# Patient Record
Sex: FEMALE | Race: WHITE | NOT HISPANIC OR LATINO | Employment: OTHER | URBAN - METROPOLITAN AREA
[De-identification: names, ages, dates, MRNs, and addresses within clinical notes are randomized per-mention and may not be internally consistent; named-entity substitution may affect disease eponyms.]

---

## 2021-07-27 PROBLEM — I10 HYPERTENSION: Status: ACTIVE | Noted: 2021-07-27

## 2021-07-27 PROBLEM — J44.9 COPD (CHRONIC OBSTRUCTIVE PULMONARY DISEASE) (HCC): Status: ACTIVE | Noted: 2021-07-27

## 2021-07-27 PROBLEM — I63.312 CEREBROVASCULAR ACCIDENT (CVA) DUE TO THROMBOSIS OF LEFT MIDDLE CEREBRAL ARTERY (HCC): Status: ACTIVE | Noted: 2020-09-23

## 2021-07-27 PROBLEM — E11.3293 TYPE 2 DIABETES MELLITUS WITH BOTH EYES AFFECTED BY MILD NONPROLIFERATIVE RETINOPATHY WITHOUT MACULAR EDEMA, WITHOUT LONG-TERM CURRENT USE OF INSULIN (HCC): Status: ACTIVE | Noted: 2021-07-27

## 2021-07-27 PROBLEM — G45.9 TIA (TRANSIENT ISCHEMIC ATTACK): Status: ACTIVE | Noted: 2021-07-22

## 2021-07-27 PROBLEM — M19.011 OSTEOARTHRITIS OF RIGHT SHOULDER: Status: ACTIVE | Noted: 2017-12-06

## 2021-07-27 PROBLEM — K80.20 GALLSTONES: Status: ACTIVE | Noted: 2021-07-27

## 2021-07-27 PROBLEM — E78.5 HYPERLIPEMIA: Status: ACTIVE | Noted: 2020-12-28

## 2021-07-27 PROBLEM — K86.2 PANCREATIC CYST: Status: ACTIVE | Noted: 2021-07-27

## 2021-09-29 PROBLEM — R10.13 EPIGASTRIC PAIN: Status: ACTIVE | Noted: 2021-09-29

## 2022-10-06 ENCOUNTER — TELEPHONE (OUTPATIENT)
Dept: OTHER | Facility: OTHER | Age: 82
End: 2022-10-06

## 2022-10-06 NOTE — TELEPHONE ENCOUNTER
Patient called saying that Dr Taurus Sylvester told her to call Dr Ayala Milligan office to schedule an appointment with Dr yAala Milligan, patient is asking if the office can give her a call to schedule her appointment with Dr Ayala Milligan  Patient stated that she is available to take any day or time for the appointment and is is unavailable to leave message on her answering machine with date and time of appointment  Patient had Cat-Scan on 9/29

## 2022-10-28 ENCOUNTER — CONSULT (OUTPATIENT)
Dept: GASTROENTEROLOGY | Facility: MEDICAL CENTER | Age: 82
End: 2022-10-28
Attending: INTERNAL MEDICINE

## 2022-10-28 VITALS
BODY MASS INDEX: 28.91 KG/M2 | WEIGHT: 163.2 LBS | HEART RATE: 73 BPM | SYSTOLIC BLOOD PRESSURE: 163 MMHG | TEMPERATURE: 97.9 F | DIASTOLIC BLOOD PRESSURE: 75 MMHG

## 2022-10-28 DIAGNOSIS — K21.9 GASTROESOPHAGEAL REFLUX DISEASE, UNSPECIFIED WHETHER ESOPHAGITIS PRESENT: ICD-10-CM

## 2022-10-28 DIAGNOSIS — K86.2 PANCREATIC CYST: Primary | ICD-10-CM

## 2022-10-28 NOTE — PATIENT INSTRUCTIONS
Scheduled date of EUS (as of today) 12/6/22  Physician performing Dr Vlad Asher:  Location of procedure  [place]:  Bowel prep reviewed with patient: rosa  Instructions reviewed with patient by: ma  Clearances: rosa BACA

## 2022-10-28 NOTE — PROGRESS NOTES
Assessment/Plan:     Diagnoses and all orders for this visit:    Pancreatic cyst  Patient has a known pancreatic cyst which has enlarged over the last 2 years concerning for malignancy  Would recommend endoscopic ultrasound with FNA  Risks and benefits of the procedure were discussed and she is agreeable to proceed  -     Endoscopic ultrasonography, GI (Upper) Linear with FNA; Future    Gastroesophageal reflux disease, unspecified whether esophagitis present  She does have a history of heartburn  She uses Pepcid dinnertime with good control of her symptoms  Would recommend continuing  She also describes vague abdominal pain which she feels may be secondary to gas  Did recommend over-the-counter Gas-X  Will see her back after to discuss all results          Subjective:      Patient ID: Frances Ocasio is a [de-identified] y o  female  HPI     Patient was referred for a pancreatic cyst   She has past medical history of diabetes, TIA, breast cancer, hypertension, COPD and GERD  Patient was found to have a pancreatic cyst dating back to at least 2015 measuring approximately 1 cm  It has been followed with imaging  Most recent CT scan was performed in September which showed that this had enlarged over the last 2 years now measuring 2 4 x 4 4 cm  She does admit to some postprandial abdominal pain but more so on the left  She also describes pain radiating into her shoulders  She does take Pepcid at dinnertime with good control of her heartburn  She describes some gas pain but has not taken anything for that  She describes her bowels as regular  She denies weight loss      Patient Active Problem List   Diagnosis   • Type 2 diabetes mellitus with both eyes affected by mild nonproliferative retinopathy without macular edema, without long-term current use of insulin (HCC)   • TIA (transient ischemic attack)   • Personal history of breast cancer   • Osteoarthritis of right shoulder   • Hypertension   • Hyperlipemia   • COPD (chronic obstructive pulmonary disease) (HCC)   • Cerebrovascular accident (CVA) due to thrombosis of left middle cerebral artery (HCC)   • Pancreatic cyst   • Gallstones   • Epigastric pain   • GERD (gastroesophageal reflux disease)     Allergies   Allergen Reactions   • Biaxin [Clarithromycin] Throat Swelling   • Latex Blisters   • Sulfa Antibiotics Hives     Current Outpatient Medications on File Prior to Visit   Medication Sig   • amLODIPine (NORVASC) 5 mg tablet    • atorvastatin (LIPITOR) 40 mg tablet    • BIOTIN PO Take by mouth daily   • Cholecalciferol 50 MCG (2000 UT) CAPS Take 2 capsules by mouth daily   • clopidogrel (PLAVIX) 75 mg tablet    • famotidine (PEPCID) 40 MG tablet    • glimepiride (AMARYL) 1 mg tablet    • losartan (COZAAR) 50 mg tablet    • metFORMIN (GLUCOPHAGE-XR) 500 mg 24 hr tablet    • Multiple Vitamin (MULTIVITAMIN PO) Take 1 tablet by mouth daily   • Naproxen Sodium (ALEVE PO) Take by mouth once as needed   • OMEGA-3 FATTY ACIDS PO Take 2 g by mouth daily   • aspirin (ECOTRIN LOW STRENGTH) 81 mg EC tablet Take 81 mg by mouth daily   (Patient not taking: No sig reported)   • gabapentin (NEURONTIN) 100 mg capsule Take 100 mg by mouth 4 (four) times a day   • oxymetazoline (AFRIN) 0 05 % nasal spray 2 sprays by Each Nare route 2 (two) times a day for 4 days (Patient not taking: Reported on 9/12/2022)     No current facility-administered medications on file prior to visit       Family History   Problem Relation Age of Onset   • Colon cancer Mother    • Lung cancer Father    • Throat cancer Father    • Breast cancer Maternal Aunt      Past Medical History:   Diagnosis Date   • Migraine      Social History     Socioeconomic History   • Marital status:      Spouse name: None   • Number of children: None   • Years of education: None   • Highest education level: None   Occupational History   • Occupation: Retired - Bookeeping/HR   Tobacco Use   • Smoking status: Never Smoker   • Smokeless tobacco: Never Used   Substance and Sexual Activity   • Alcohol use: Not Currently     Comment: rarely   • Drug use: None   • Sexual activity: None   Other Topics Concern   • None   Social History Narrative   • None     Social Determinants of Health     Financial Resource Strain: Not on file   Food Insecurity: Not on file   Transportation Needs: Not on file   Physical Activity: Not on file   Stress: Not on file   Social Connections: Not on file   Intimate Partner Violence: Not on file   Housing Stability: Not on file     Past Surgical History:   Procedure Laterality Date   • EGD  2022    dr Damaris Kunz; 2cm h hernia         Review of Systems   All other systems reviewed and are negative  Objective:      /75   Pulse 73   Temp 97 9 °F (36 6 °C) (Tympanic)   Wt 74 kg (163 lb 3 2 oz)   BMI 28 91 kg/m²          Physical Exam  Constitutional:       Appearance: Normal appearance  She is well-developed  HENT:      Head: Normocephalic and atraumatic  Eyes:      Conjunctiva/sclera: Conjunctivae normal    Cardiovascular:      Rate and Rhythm: Normal rate and regular rhythm  Pulmonary:      Effort: Pulmonary effort is normal       Breath sounds: Normal breath sounds  Abdominal:      General: Bowel sounds are normal  There is no distension  Palpations: Abdomen is soft  Tenderness: There is no abdominal tenderness  Musculoskeletal:      Cervical back: Normal range of motion  Comments: Walks with cane   Skin:     General: Skin is warm and dry  Neurological:      Mental Status: She is alert and oriented to person, place, and time     Psychiatric:         Mood and Affect: Mood normal          Behavior: Behavior normal

## 2022-11-02 ENCOUNTER — TELEPHONE (OUTPATIENT)
Dept: GASTROENTEROLOGY | Facility: CLINIC | Age: 82
End: 2022-11-02

## 2022-11-02 NOTE — TELEPHONE ENCOUNTER
Patients GI provider:  Dr Tequila Douglas    Number to return call: [phone]    Reason for call: Pt calling to see if she can have her procedure any sooner and/or be put on a cancellation list  Please return her call      Scheduled procedure/appointment date if applicable: Apt/procedure 12/6/22

## 2022-11-07 NOTE — TELEPHONE ENCOUNTER
Patient called back stating Dr Shante Ramirez manages her Plavix and medication clearance has been faxed to [phone]  Will monitor for a reply

## 2022-11-07 NOTE — TELEPHONE ENCOUNTER
Patient called back leaving a message that 11/15/22 will work for her  Called patient back and left message if she is still taking Plavix or not  Asked that she call me back on my direct line to clarify

## 2022-11-08 NOTE — TELEPHONE ENCOUNTER
Left detailed message for patient advising she may hold her Plavix 5 days prior to upcoming procedure and to call back with any questions

## 2022-11-15 ENCOUNTER — ANESTHESIA (OUTPATIENT)
Dept: GASTROENTEROLOGY | Facility: HOSPITAL | Age: 82
End: 2022-11-15

## 2022-11-15 ENCOUNTER — HOSPITAL ENCOUNTER (OUTPATIENT)
Dept: GASTROENTEROLOGY | Facility: HOSPITAL | Age: 82
Setting detail: OUTPATIENT SURGERY
Discharge: HOME | End: 2022-11-15
Attending: INTERNAL MEDICINE

## 2022-11-15 ENCOUNTER — ANESTHESIA EVENT (OUTPATIENT)
Dept: GASTROENTEROLOGY | Facility: HOSPITAL | Age: 82
End: 2022-11-15

## 2022-11-15 VITALS
DIASTOLIC BLOOD PRESSURE: 73 MMHG | RESPIRATION RATE: 16 BRPM | HEART RATE: 63 BPM | OXYGEN SATURATION: 96 % | TEMPERATURE: 97.2 F | SYSTOLIC BLOOD PRESSURE: 137 MMHG

## 2022-11-15 DIAGNOSIS — K86.2 PANCREATIC CYST: ICD-10-CM

## 2022-11-15 RX ORDER — SODIUM CHLORIDE, SODIUM LACTATE, POTASSIUM CHLORIDE, CALCIUM CHLORIDE 600; 310; 30; 20 MG/100ML; MG/100ML; MG/100ML; MG/100ML
50 INJECTION, SOLUTION INTRAVENOUS CONTINUOUS
Status: DISCONTINUED | OUTPATIENT
Start: 2022-11-15 | End: 2022-11-19 | Stop reason: HOSPADM

## 2022-11-15 RX ORDER — CIPROFLOXACIN 2 MG/ML
INJECTION, SOLUTION INTRAVENOUS CONTINUOUS PRN
Status: DISCONTINUED | OUTPATIENT
Start: 2022-11-15 | End: 2022-11-15

## 2022-11-15 RX ORDER — LIDOCAINE HYDROCHLORIDE 10 MG/ML
0.5 INJECTION, SOLUTION EPIDURAL; INFILTRATION; INTRACAUDAL; PERINEURAL ONCE AS NEEDED
Status: DISCONTINUED | OUTPATIENT
Start: 2022-11-15 | End: 2022-11-19 | Stop reason: HOSPADM

## 2022-11-15 RX ORDER — LEVOFLOXACIN 500 MG/1
500 TABLET, FILM COATED ORAL EVERY 24 HOURS
Qty: 3 TABLET | Refills: 0 | Status: SHIPPED | OUTPATIENT
Start: 2022-11-15 | End: 2022-11-18

## 2022-11-15 RX ORDER — PROPOFOL 10 MG/ML
INJECTION, EMULSION INTRAVENOUS AS NEEDED
Status: DISCONTINUED | OUTPATIENT
Start: 2022-11-15 | End: 2022-11-15

## 2022-11-15 RX ORDER — SODIUM CHLORIDE 9 MG/ML
INJECTION, SOLUTION INTRAVENOUS CONTINUOUS PRN
Status: DISCONTINUED | OUTPATIENT
Start: 2022-11-15 | End: 2022-11-15

## 2022-11-15 RX ADMIN — PROPOFOL 30 MG: 10 INJECTION, EMULSION INTRAVENOUS at 11:20

## 2022-11-15 RX ADMIN — CIPROFLOXACIN: 2 INJECTION, SOLUTION INTRAVENOUS at 11:12

## 2022-11-15 RX ADMIN — PROPOFOL 20 MG: 10 INJECTION, EMULSION INTRAVENOUS at 11:16

## 2022-11-15 RX ADMIN — SODIUM CHLORIDE: 0.9 INJECTION, SOLUTION INTRAVENOUS at 11:09

## 2022-11-15 RX ADMIN — PROPOFOL 40 MG: 10 INJECTION, EMULSION INTRAVENOUS at 11:25

## 2022-11-15 RX ADMIN — PROPOFOL 50 MG: 10 INJECTION, EMULSION INTRAVENOUS at 11:15

## 2022-11-15 RX ADMIN — PROPOFOL 30 MG: 10 INJECTION, EMULSION INTRAVENOUS at 11:18

## 2022-11-15 RX ADMIN — PROPOFOL 50 MG: 10 INJECTION, EMULSION INTRAVENOUS at 11:31

## 2022-11-15 NOTE — H&P
[facility] and Physical -  [facility]  Jona Sales [de-identified] y o  female MRN: [id number]                  HPI: Jona Sales is a [de-identified]y o  year old female who presents for EUS with FNA of pancreatic cyst      REVIEW OF SYSTEMS: Per the HPI, and otherwise unremarkable  Historical Information   Past Medical History:   Diagnosis Date   • Arthritis    • COPD (chronic obstructive pulmonary disease) ([facility] )    • Diabetes mellitus ([facility] )    • Hyperlipidemia    • Hypertension    • Migraine    • Stroke [facility])      Past Surgical History:   Procedure Laterality Date   • BREAST SURGERY     • EGD  2022    dr Justo Locke; 2cm h hernia   • JOINT REPLACEMENT     • OOPHORECTOMY Left     1990   • SKIN BIOPSY       Social History   Social History     Substance and Sexual Activity   Alcohol Use Not Currently    Comment: rarely     Social History     Substance and Sexual Activity   Drug Use Not on file     Social History     Tobacco Use   Smoking Status Never Smoker   Smokeless Tobacco Never Used     Family History   Problem Relation Age of Onset   • Colon cancer Mother    • Lung cancer Father    • Throat cancer Father    • Breast cancer Maternal Aunt        Meds/Allergies     (Not in a hospital admission)      Allergies   Allergen Reactions   • Biaxin [Clarithromycin] Throat Swelling   • Latex Blisters   • Sulfa Antibiotics Hives       Objective     There were no vitals taken for this visit        PHYSICAL EXAM    Gen: NAD  CV: RRR  CHEST: Clear  ABD: soft, NT/ND  EXT: no edema      ASSESSMENT/PLAN:  This is a [de-identified]y o  year old female here for EUS with FNA

## 2022-11-15 NOTE — ANESTHESIA POSTPROCEDURE EVALUATION
Post-Op Assessment Note    CV Status:  Stable  Pain Score: 0    Pain management: adequate     Mental Status:  Alert and awake   Hydration Status:  Euvolemic   PONV Controlled:  Controlled   Airway Patency:  Patent      Post Op Vitals Reviewed: Yes      Staff: CRNA         No complications documented      /65 (11/15/22 1142)    Temp (!) 97 2 °F (36 2 °C) (11/15/22 1142)    Pulse 69 (11/15/22 1142)   Resp 16 (11/15/22 1142)    SpO2 95 % (11/15/22 1142)

## 2022-11-15 NOTE — ANESTHESIA PREPROCEDURE EVALUATION
Procedure:  ENDOSCOPIC ULTRASOUND (UPPER)    Relevant Problems   CARDIO   (+) Hyperlipemia   (+) Hypertension      ENDO   (+) Type 2 diabetes mellitus with both eyes affected by mild nonproliferative retinopathy without macular edema, without long-term current use of insulin (HCC)      GI/HEPATIC   (+) GERD (gastroesophageal reflux disease)   (+) Pancreatic cyst      MUSCULOSKELETAL   (+) Osteoarthritis of right shoulder      NEURO/PSYCH   (+) Cerebrovascular accident (CVA) due to thrombosis of left middle cerebral artery (HCC)   (+) Personal history of breast cancer   (+) TIA (transient ischemic attack)      PULMONARY   (+) COPD (chronic obstructive pulmonary disease) (HCC)      Digestive   (+) Gallstones      Other   (+) Epigastric pain        Physical Exam    Airway    Mallampati score: II  TM Distance: >3 FB  Neck ROM: full     Dental   No notable dental hx     Cardiovascular  Cardiovascular exam normal    Pulmonary  Pulmonary exam normal Breath sounds clear to auscultation,     Other Findings        Anesthesia Plan  ASA Score- 3     Anesthesia Type- IV sedation with anesthesia with ASA Monitors  Additional Monitors:   Airway Plan:           Plan Factors-    Chart reviewed  EKG reviewed  Imaging results reviewed  Existing labs reviewed  Patient summary reviewed  Patient is not a current smoker  Patient instructed to abstain from smoking on day of procedure  Patient did not smoke on day of surgery  Obstructive sleep apnea risk education given perioperatively  Induction- intravenous  Postoperative Plan-     Informed Consent- Anesthetic plan and risks discussed with patient  I personally reviewed this patient with the CRNA  Discussed and agreed on the Anesthesia Plan with the CRNA  Carmen Angeles               Discussed with pt the benefits/alternatives and risks or General Anesthesia including breathing tube remaining in place if not strong enough, PONV, damage to lips and teeth, sore throat, eye injury or blindness    I, Dr Masoud Holt, the attending physician, have personally seen and evaluated the patient prior to anesthetic care  I have reviewed the pre-anesthetic record, and other medical records if appropriate to the anesthetic care  If a CRNA is involved in the case, I have reviewed the CRNA assessment, if present, and agree  The patient is in a suitable condition to proceed with my formulated anesthetic plan

## 2022-11-23 ENCOUNTER — PREP FOR PROCEDURE (OUTPATIENT)
Dept: GASTROENTEROLOGY | Facility: CLINIC | Age: 82
End: 2022-11-23

## 2022-11-23 DIAGNOSIS — K25.9 GASTRIC ULCER WITHOUT HEMORRHAGE OR PERFORATION, UNSPECIFIED CHRONICITY: Primary | ICD-10-CM

## 2022-11-23 DIAGNOSIS — K86.2 PANCREATIC CYST: ICD-10-CM

## 2022-11-29 ENCOUNTER — TELEPHONE (OUTPATIENT)
Dept: GASTROENTEROLOGY | Facility: CLINIC | Age: 82
End: 2022-11-29

## 2022-11-29 NOTE — TELEPHONE ENCOUNTER
TC to pt to schedule 3 month repeat EGD  Pt is diabetic      Scheduled date of EGD(as of today):  2/28/23  Physician performing EGD:  Dr Deja Yen  Location of EGD:  [facility]  Instructions reviewed with patient by: mailed to pt's home  Clearances: Plavix - Dr Nu Simmons

## 2022-11-29 NOTE — TELEPHONE ENCOUNTER
----- Message from Chelsie Odonnell sent at 11/29/2022 10:22 AM EST -----    ----- Message -----  From: Abran Ruiz MD  Sent: 11/23/2022  11:57 AM EST  To: , #    Please schedule an EGD with me at the [facility] in 3 months, order was placed, Thanks

## 2022-12-22 ENCOUNTER — OFFICE VISIT (OUTPATIENT)
Dept: GASTROENTEROLOGY | Facility: MEDICAL CENTER | Age: 82
End: 2022-12-22

## 2022-12-22 VITALS
BODY MASS INDEX: 28.7 KG/M2 | WEIGHT: 162 LBS | HEIGHT: 63 IN | DIASTOLIC BLOOD PRESSURE: 78 MMHG | SYSTOLIC BLOOD PRESSURE: 126 MMHG

## 2022-12-22 DIAGNOSIS — K86.2 PANCREATIC CYST: Primary | ICD-10-CM

## 2022-12-22 DIAGNOSIS — K25.7 CHRONIC GASTRIC ULCER WITHOUT HEMORRHAGE AND WITHOUT PERFORATION: ICD-10-CM

## 2022-12-22 PROBLEM — K25.9 GASTRIC ULCER: Status: ACTIVE | Noted: 2022-12-22

## 2022-12-22 RX ORDER — PANTOPRAZOLE SODIUM 40 MG/1
TABLET, DELAYED RELEASE ORAL
COMMUNITY
Start: 2022-11-22

## 2022-12-22 RX ORDER — HYDROCHLOROTHIAZIDE 12.5 MG/1
12.5 TABLET ORAL
COMMUNITY

## 2022-12-22 RX ORDER — CEPHALEXIN 250 MG/1
CAPSULE ORAL
COMMUNITY
Start: 2022-11-22

## 2022-12-22 NOTE — PROGRESS NOTES
Assessment/Plan:     Diagnoses and all orders for this visit:    Pancreatic cyst  Patient has a known pancreatic cyst which is enlarged over the last 2 years  She underwent endoscopic ultrasound for further evaluation  Underwent FNA which showed her amylase was high, CEA was low, fluid was acellular and likely benign  Recommended to have a repeat MRI in 1 years time  Chronic gastric ulcer without hemorrhage and without perforation  During her procedure she was found to have a gastric ulcer  She is currently on pantoprazole and denies significant reflux symptoms  She does use Aleve which she is trying to cut back on but she does have significant arthritis  Did encourage her to take it with food and not to lie down after  She will have a repeat endoscopy in February to ensure healing  We'll see her back after discuss all results  Subjective:      Patient ID: Bertha Vincent is a [de-identified] y o  female  HPI     Patient was referred for a pancreatic cyst   She has past medical history of diabetes, TIA, breast cancer, hypertension, COPD and GERD  Patient was found to have a pancreatic cyst dating back to at least 2015 measuring approximately 1 cm  It has been followed with imaging  Most recent CT scan was performed in September which showed that this had enlarged over the last 2 years now measuring 2 4 x 4 4 cm  She does take Pepcid at dinnertime with good control of her heartburn  She describes her bowels as regular  She denies weight loss  It was recommended she undergo endoscopic ultrasound for further evaluation  This was performed in November  EGD showed a linear gastric ulcer, 2 x 3 cm cyst in the head/neck, 1 cm cyst at the body, communicating with the pancreatic duct  Biopsy was negative for H  pylori  Patient does use Aleve several times a week and has been trying to cut back  Amylase was high, CEA was low, fluid was acellular and likely benign    Recommended she have her MRI repeated in 1 years time  Patient Active Problem List   Diagnosis   • Type 2 diabetes mellitus with both eyes affected by mild nonproliferative retinopathy without macular edema, without long-term current use of insulin ([facility])   • TIA (transient ischemic attack)   • Personal history of breast cancer   • Osteoarthritis of right shoulder   • Hypertension   • Hyperlipemia   • COPD (chronic obstructive pulmonary disease) ([facility])   • Cerebrovascular accident (CVA) due to thrombosis of left middle cerebral artery ([facility])   • Pancreatic cyst   • Gallstones   • Epigastric pain   • GERD (gastroesophageal reflux disease)   • Gastric ulcer     Allergies   Allergen Reactions   • Biaxin [Clarithromycin] Throat Swelling   • Latex Blisters   • Sulfa Antibiotics Hives     Current Outpatient Medications on File Prior to Visit   Medication Sig   • amLODIPine (NORVASC) 5 mg tablet    • atorvastatin (LIPITOR) 40 mg tablet    • BIOTIN PO Take by mouth daily   • Cholecalciferol 50 MCG (2000 UT) CAPS Take 2 capsules by mouth daily   • clopidogrel (PLAVIX) 75 mg tablet    • glimepiride (AMARYL) 1 mg tablet    • hydrochlorothiazide (HYDRODIURIL) 12 5 mg tablet Take 12 5 mg by mouth   • losartan (COZAAR) 50 mg tablet    • metFORMIN (GLUCOPHAGE-XR) 500 mg 24 hr tablet    • Multiple Vitamin (MULTIVITAMIN PO) Take 1 tablet by mouth daily   • Naproxen Sodium (ALEVE PO) Take by mouth once as needed   • OMEGA-3 FATTY ACIDS PO Take 2 g by mouth daily   • pantoprazole (PROTONIX) 40 mg tablet    • cephalexin (KEFLEX) 250 mg capsule  (Patient not taking: Reported on 12/22/2022)   • famotidine (PEPCID) 40 MG tablet  (Patient not taking: Reported on 12/22/2022)   • gabapentin (NEURONTIN) 100 mg capsule Take 100 mg by mouth 4 (four) times a day   • oxymetazoline (AFRIN) 0 05 % nasal spray 2 sprays by Each Nare route 2 (two) times a day for 4 days (Patient not taking: Reported on 9/12/2022)     No current facility-administered medications on file prior to visit  Family History   Problem Relation Age of Onset   • Colon cancer Mother    • Lung cancer Father    • Throat cancer Father    • Breast cancer Maternal Aunt      Past Medical History:   Diagnosis Date   • Arthritis    • COPD (chronic obstructive pulmonary disease) ([facility] )    • Diabetes mellitus ([email or web address] )    • Hyperlipidemia    • Hypertension    • Migraine    • Stroke [facility])      Social History     Socioeconomic History   • Marital status:      Spouse name: None   • Number of children: None   • Years of education: None   • Highest education level: None   Occupational History   • Occupation: Retired - Bookeeping/HR   Tobacco Use   • Smoking status: Never   • Smokeless tobacco: Never   Substance and Sexual Activity   • Alcohol use: Not Currently     Comment: rarely   • Drug use: None   • Sexual activity: None   Other Topics Concern   • None   Social History Narrative   • None     Social Determinants of Health     Financial Resource Strain: Not on file   Food Insecurity: Not on file   Transportation Needs: Not on file   Physical Activity: Not on file   Stress: Not on file   Social Connections: Not on file   Intimate Partner Violence: Not on file   Housing Stability: Not on file     Past Surgical History:   Procedure Laterality Date   • BREAST SURGERY     • EGD  2022    dr Olivia Cesar; 2cm h hernia   • JOINT REPLACEMENT     • OOPHORECTOMY Left     1990   • SKIN BIOPSY           Review of Systems   All other systems reviewed and are negative  Objective:      /78   Ht 5' 3" (1 6 m)   Wt 73 5 kg (162 lb)   BMI 28 70 kg/m²          Physical Exam  Constitutional:       Appearance: Normal appearance  She is well-developed  HENT:      Head: Normocephalic and atraumatic  Eyes:      Conjunctiva/sclera: Conjunctivae normal    Cardiovascular:      Rate and Rhythm: Normal rate and regular rhythm  Pulmonary:      Effort: Pulmonary effort is normal       Breath sounds: Normal breath sounds     Abdominal:      General: Bowel sounds are normal  There is no distension  Palpations: Abdomen is soft  Tenderness: There is no abdominal tenderness  Musculoskeletal:      Cervical back: Normal range of motion  Comments: Walks with cane   Skin:     General: Skin is warm and dry  Neurological:      Mental Status: She is alert and oriented to person, place, and time     Psychiatric:         Mood and Affect: Mood normal          Behavior: Behavior normal

## 2023-02-27 RX ORDER — SODIUM CHLORIDE 9 MG/ML
125 INJECTION, SOLUTION INTRAVENOUS CONTINUOUS
Status: CANCELLED | OUTPATIENT
Start: 2023-02-27

## 2023-02-28 ENCOUNTER — HOSPITAL ENCOUNTER (OUTPATIENT)
Dept: GASTROENTEROLOGY | Facility: MEDICAL CENTER | Age: 83
Setting detail: OUTPATIENT SURGERY
Discharge: HOME | End: 2023-02-28

## 2023-02-28 ENCOUNTER — ANESTHESIA EVENT (OUTPATIENT)
Dept: GASTROENTEROLOGY | Facility: MEDICAL CENTER | Age: 83
End: 2023-02-28

## 2023-02-28 ENCOUNTER — ANESTHESIA (OUTPATIENT)
Dept: GASTROENTEROLOGY | Facility: MEDICAL CENTER | Age: 83
End: 2023-02-28

## 2023-02-28 VITALS
RESPIRATION RATE: 20 BRPM | DIASTOLIC BLOOD PRESSURE: 71 MMHG | HEART RATE: 71 BPM | OXYGEN SATURATION: 97 % | SYSTOLIC BLOOD PRESSURE: 135 MMHG | TEMPERATURE: 98.6 F

## 2023-02-28 DIAGNOSIS — K25.9 GASTRIC ULCER WITHOUT HEMORRHAGE OR PERFORATION, UNSPECIFIED CHRONICITY: ICD-10-CM

## 2023-02-28 RX ORDER — LIDOCAINE HYDROCHLORIDE 20 MG/ML
INJECTION, SOLUTION EPIDURAL; INFILTRATION; INTRACAUDAL; PERINEURAL AS NEEDED
Status: DISCONTINUED | OUTPATIENT
Start: 2023-02-28 | End: 2023-02-28

## 2023-02-28 RX ORDER — FUROSEMIDE 20 MG/1
TABLET ORAL
COMMUNITY
Start: 2023-01-03

## 2023-02-28 RX ORDER — PROPOFOL 10 MG/ML
INJECTION, EMULSION INTRAVENOUS AS NEEDED
Status: DISCONTINUED | OUTPATIENT
Start: 2023-02-28 | End: 2023-02-28

## 2023-02-28 RX ORDER — SODIUM CHLORIDE 9 MG/ML
125 INJECTION, SOLUTION INTRAVENOUS CONTINUOUS
Status: DISCONTINUED | OUTPATIENT
Start: 2023-02-28 | End: 2023-03-04 | Stop reason: HOSPADM

## 2023-02-28 RX ORDER — PANTOPRAZOLE SODIUM 20 MG/1
20 TABLET, DELAYED RELEASE ORAL DAILY
Qty: 30 TABLET | Refills: 2 | Status: SHIPPED | OUTPATIENT
Start: 2023-02-28 | End: 2023-05-29

## 2023-02-28 RX ADMIN — Medication 40 MG: at 07:35

## 2023-02-28 RX ADMIN — PROPOFOL 50 MG: 10 INJECTION, EMULSION INTRAVENOUS at 07:35

## 2023-02-28 RX ADMIN — LIDOCAINE HYDROCHLORIDE 50 MG: 20 INJECTION, SOLUTION EPIDURAL; INFILTRATION; INTRACAUDAL at 07:32

## 2023-02-28 RX ADMIN — PROPOFOL 100 MG: 10 INJECTION, EMULSION INTRAVENOUS at 07:32

## 2023-02-28 RX ADMIN — SODIUM CHLORIDE 125 ML/HR: 0.9 INJECTION, SOLUTION INTRAVENOUS at 07:24

## 2023-02-28 NOTE — ANESTHESIA PREPROCEDURE EVALUATION
Procedure:  EGD    Relevant Problems   ANESTHESIA (within normal limits)      CARDIO   (+) Hyperlipemia   (+) Hypertension      ENDO   (+) Type 2 diabetes mellitus with both eyes affected by mild nonproliferative retinopathy without macular edema, without long-term current use of insulin (HCC)      GI/HEPATIC   (+) GERD (gastroesophageal reflux disease)   (+) Gastric ulcer   (+) Pancreatic cyst      MUSCULOSKELETAL   (+) Osteoarthritis of right shoulder      NEURO/PSYCH   (+) Cerebrovascular accident (CVA) due to thrombosis of left middle cerebral artery (HCC)   (+) Personal history of breast cancer   (+) TIA (transient ischemic attack)      PULMONARY   (+) COPD (chronic obstructive pulmonary disease) (HCC)        Physical Exam    Airway    Mallampati score: II  TM Distance: >3 FB  Neck ROM: full     Dental   Comment: partials,     Cardiovascular  Cardiovascular exam normal    Pulmonary  Pulmonary exam normal     Other Findings        Anesthesia Plan  ASA Score- 3     Anesthesia Type- IV sedation with anesthesia with ASA Monitors  Additional Monitors:   Airway Plan:           Plan Factors-    Chart reviewed  Existing labs reviewed  Patient summary reviewed  Induction- intravenous  Postoperative Plan-     Informed Consent- Anesthetic plan and risks discussed with patient

## 2023-02-28 NOTE — ANESTHESIA POSTPROCEDURE EVALUATION
Post-Op Assessment Note    CV Status:  Stable    Pain management: adequate     Mental Status:  Alert and awake   Hydration Status:  Euvolemic   PONV Controlled:  Controlled   Airway Patency:  Patent      Post Op Vitals Reviewed: Yes      Staff: Anesthesiologist         No notable events documented      /71 (02/28/23 0802)    Temp      Pulse 71 (02/28/23 0802)   Resp 20 (02/28/23 0802)    SpO2 97 % (02/28/23 0802)

## 2023-02-28 NOTE — H&P
History and Physical - [facility]  Jozef Rosen [de-identified] y o  female MRN: [id number]                  HPI: Jozef Rosen is a [de-identified]y o  year old female who presents for EGD for the assessment of gastric ulcer      REVIEW OF SYSTEMS: Per the HPI, and otherwise unremarkable  Historical Information   Past Medical History:   Diagnosis Date   • Arthritis    • [facility]ch[facility] disease) [facility] )    • Diabetes mellitus ([facility] )    • Hyperlipidemia    • Hypertension    • Migraine    • Stroke [facility])      Past Surgical History:   Procedure Laterality Date   • BREAST SURGERY     • EGD  2022    dr Toni Jackman; 2cm h hernia   • JOINT REPLACEMENT     • OOPHORECTOMY Left     1990   • SKIN BIOPSY       Social History   Social History     Substance and Sexual Activity   Alcohol Use Not Currently    Comment: rarely     Social History     Substance and Sexual Activity   Drug Use Not on file     Social History     Tobacco Use   Smoking Status Never   Smokeless Tobacco Never     Family History   Problem Relation Age of Onset   • Colon cancer Mother    • Lung cancer Father    • Throat cancer Father    • Breast cancer Maternal Aunt        Meds/Allergies     (Not in a hospital admission)      Allergies   Allergen Reactions   • Biaxin [Clarithromycin] Throat Swelling   • Cetirizine Itching     Other reaction(s): Itching     • Corticosteroids Other (See Comments)     high blood sugars     • Doxycycline Other (See Comments)   • Glyburide Other (See Comments)     Unknown; lashaun glimepiride  Other reaction(s): Increased heart rate/itching palms     • Latex Blisters   • Sulfa Antibiotics Hives   • Celecoxib Palpitations and Other (See Comments)     rapid heart rate         Objective     Blood pressure 161/75, pulse 69, temperature 98 6 °F (37 °C), temperature source Temporal, resp  rate 16, SpO2 95 %        PHYSICAL EXAM    Gen: NAD  CV: RRR  CHEST: Clear  ABD: soft, NT/ND  EXT: no edema      ASSESSMENT/PLAN:  This is a 82 y o  year old female here for EGD

## 2023-03-02 ENCOUNTER — NURSE TRIAGE (OUTPATIENT)
Dept: OTHER | Facility: OTHER | Age: 83
End: 2023-03-02

## 2023-03-02 NOTE — TELEPHONE ENCOUNTER
Patient just got off the phone with Dr Amber Thomas  who did her procedure and she made him aware of her SXS  He told her it may be her heart  She now has an appointment with her PCP who she also just spoke to that office prior to my call back  She has as appointment for tomorrow morning at 0815

## 2023-03-02 NOTE — TELEPHONE ENCOUNTER
Regarding: Had endoscopy done on 2/28 started having pain on left side wednesday night  ----- Message from Klaus Gunderson sent at 3/2/2023  4:20 PM EST -----  '' I had a endoscopy done on 2/28, I started having pain on my left side on Wednesday night the pain has gotten a little worse looking for medical advice  ''

## 2023-03-10 ENCOUNTER — OFFICE VISIT (OUTPATIENT)
Dept: GASTROENTEROLOGY | Facility: MEDICAL CENTER | Age: 83
End: 2023-03-10

## 2023-03-10 VITALS
DIASTOLIC BLOOD PRESSURE: 80 MMHG | SYSTOLIC BLOOD PRESSURE: 116 MMHG | WEIGHT: 164 LBS | HEART RATE: 78 BPM | BODY MASS INDEX: 29.06 KG/M2 | HEIGHT: 63 IN | OXYGEN SATURATION: 98 %

## 2023-03-10 DIAGNOSIS — K86.2 PANCREATIC CYST: Primary | ICD-10-CM

## 2023-03-10 DIAGNOSIS — K25.3 ACUTE GASTRIC ULCER WITHOUT HEMORRHAGE OR PERFORATION: ICD-10-CM

## 2023-03-10 NOTE — PROGRESS NOTES
Assessment/Plan:     Diagnoses and all orders for this visit:    Pancreatic cyst  She has a history of a pancreatic cyst which has been monitored with imaging however was found to have increase in size and recommended to undergo endoscopic ultrasound  This was performed in November, underwent FNA which showed her amylase was high, CEA was low, fluid was acellular and likely benign  Recommended repeat imaging in 1 year  Acute gastric ulcer without hemorrhage or perforation  During her endoscopic ultrasound she was found to have a small ulcer  She underwent repeat endoscopy in February which showed this had healed  She is currently on pantoprazole and denies any symptoms  She would like to go back to the Pepcid as previously taking  I agree with de-escalating the medication however if symptoms return she may need to resume  We will see her back after her MRI to discuss further management  Subjective:      Patient ID: Alycia Tejada is a [de-identified] y o  female  HPI     This is a follow up for a pancreatic cyst, gastric ulcer & to discuss her EGD  Behzad Sers has past medical history of diabetes, TIA, breast cancer, hypertension, COPD and GERD   Patient was found to have a pancreatic cyst dating back to at least 2015 measuring approximately 1 cm   It has been followed with imaging   Most recent CT scan was performed in September 2022 which showed that this had enlarged over the last 2 years now measuring 2 4 x 4 4 cm   She does take Pepcid at dinnertime with good control of her heartburn   She describes her bowels as regular   She denies weight loss  It was recommended she undergo endoscopic ultrasound for further evaluation  This was performed in November  EGD showed a linear gastric ulcer, 2 x 3 cm cyst in the head/neck, 1 cm cyst at the body, communicating with the pancreatic duct  Biopsy was negative for H  pylori  Patient does use Aleve several times a week and has been trying to cut back    Amylase was high, CEA was low, fluid was acellular and likely benign  Recommended she have her MRI repeated in 1 years time  She underwent repeat endoscopy in February 2023 showing small hiatal hernia, previous ulcer had healed  Biopsies were negative for H  pylori      Patient Active Problem List   Diagnosis   • Type 2 diabetes mellitus with both eyes affected by mild nonproliferative retinopathy without macular edema, without long-term current use of insulin (HCC)   • TIA (transient ischemic attack)   • Personal history of breast cancer   • Osteoarthritis of right shoulder   • Hypertension   • Hyperlipemia   • COPD (chronic obstructive pulmonary disease) (HCC)   • Cerebrovascular accident (CVA) due to thrombosis of left middle cerebral artery (HCC)   • Pancreatic cyst   • Gallstones   • Epigastric pain   • GERD (gastroesophageal reflux disease)   • Gastric ulcer     Allergies   Allergen Reactions   • Biaxin [Clarithromycin] Throat Swelling   • Cetirizine Itching     Other reaction(s): Itching     • Corticosteroids Other (See Comments)     high blood sugars     • Doxycycline Other (See Comments)   • Glyburide Other (See Comments)     Unknown; lashaun glimepiride  Other reaction(s): Increased heart rate/itching palms     • Latex Blisters   • Sulfa Antibiotics Hives   • Celecoxib Palpitations and Other (See Comments)     rapid heart rate       Current Outpatient Medications on File Prior to Visit   Medication Sig   • amLODIPine (NORVASC) 5 mg tablet    • atorvastatin (LIPITOR) 40 mg tablet    • BIOTIN PO Take by mouth daily   • Cholecalciferol 50 MCG (2000 UT) CAPS Take 2 capsules by mouth daily   • clopidogrel (PLAVIX) 75 mg tablet    • furosemide (LASIX) 20 mg tablet    • glimepiride (AMARYL) 1 mg tablet    • hydrochlorothiazide (HYDRODIURIL) 12 5 mg tablet Take 12 5 mg by mouth   • losartan (COZAAR) 50 mg tablet    • metFORMIN (GLUCOPHAGE-XR) 500 mg 24 hr tablet    • Multiple Vitamin (MULTIVITAMIN PO) Take 1 tablet by mouth daily   • Naproxen Sodium (ALEVE PO) Take by mouth once as needed   • OMEGA-3 FATTY ACIDS PO Take 2 g by mouth daily   • pantoprazole (PROTONIX) 20 mg tablet Take 1 tablet (20 mg total) by mouth daily   • cephalexin (KEFLEX) 250 mg capsule  (Patient not taking: Reported on 12/22/2022)   • famotidine (PEPCID) 40 MG tablet  (Patient not taking: Reported on 12/22/2022)   • gabapentin (NEURONTIN) 100 mg capsule Take 100 mg by mouth 4 (four) times a day   • oxymetazoline (AFRIN) 0 05 % nasal spray 2 sprays by Each Nare route 2 (two) times a day for 4 days (Patient not taking: Reported on 9/12/2022)     No current facility-administered medications on file prior to visit       Family History   Problem Relation Age of Onset   • Colon cancer Mother    • Lung cancer Father    • Throat cancer Father    • Breast cancer Maternal Aunt      Past Medical History:   Diagnosis Date   • Arthritis    • COPD (chronic obstructive pulmonary disease) ([facility] )    • Diabetes mellitus ([facility] )    • Hyperlipidemia    • Hypertension    • Migraine    • Stroke [facility])      Social History     Socioeconomic History   • Marital status:      Spouse name: None   • Number of children: None   • Years of education: None   • Highest education level: None   Occupational History   • Occupation: Retired - Bookeeping/HR   Tobacco Use   • Smoking status: Never   • Smokeless tobacco: Never   Substance and Sexual Activity   • Alcohol use: Not Currently     Comment: rarely   • Drug use: None   • Sexual activity: None   Other Topics Concern   • None   Social History Narrative   • None     Social Determinants of Health     Financial Resource Strain: Not on file   Food Insecurity: Not on file   Transportation Needs: Not on file   Physical Activity: Not on file   Stress: Not on file   Social Connections: Not on file   Intimate Partner Violence: Not on file   Housing Stability: Not on file     Past Surgical History:   Procedure Laterality Date   • BREAST SURGERY     • EGD  2022    dr De La Cruz Patches; 2cm h hernia   • JOINT REPLACEMENT     • OOPHORECTOMY Left     1990   • SKIN BIOPSY           Review of Systems   All other systems reviewed and are negative  Objective:      /80   Pulse 78   Ht 5' 3" (1 6 m)   Wt 74 4 kg (164 lb)   SpO2 98%   BMI 29 05 kg/m²          Physical Exam  Constitutional:       Appearance: Normal appearance  She is well-developed  HENT:      Head: Normocephalic and atraumatic  Eyes:      Conjunctiva/sclera: Conjunctivae normal    Cardiovascular:      Rate and Rhythm: Normal rate and regular rhythm  Pulmonary:      Effort: Pulmonary effort is normal       Breath sounds: Normal breath sounds  Abdominal:      General: Bowel sounds are normal  There is no distension  Palpations: Abdomen is soft  Tenderness: There is no abdominal tenderness  Musculoskeletal:      Cervical back: Normal range of motion  Comments: Walks with a cane   Skin:     General: Skin is warm and dry  Neurological:      Mental Status: She is alert and oriented to person, place, and time     Psychiatric:         Mood and Affect: Mood normal          Behavior: Behavior normal

## 2023-05-15 ENCOUNTER — TELEPHONE (OUTPATIENT)
Dept: GASTROENTEROLOGY | Facility: CLINIC | Age: 83
End: 2023-05-15

## 2023-05-15 NOTE — TELEPHONE ENCOUNTER
Patients GI provider:  French Avendaño    Number to return call: [phone]    Reason for call: Pt called in requesting to speak to someone in regards to a bill that she received from her EUS procedure on 11/15/22  Pt stated that its an anesthesia bill for $2600  Pt stated that this should be covered and she has no intentions on paying this  Above is her call back number       Scheduled procedure/appointment date if applicable: Apt/procedure NA

## 2023-11-13 ENCOUNTER — TELEPHONE (OUTPATIENT)
Age: 83
End: 2023-11-13

## 2023-11-13 DIAGNOSIS — K86.2 PANCREATIC CYST: Primary | ICD-10-CM

## 2023-11-13 NOTE — TELEPHONE ENCOUNTER
Patients GI provider:  Dr. William Jerez    Number to return call: (      [phone]       Reason for call: Pt will need BUN / creatnine done prior to upcoming MRI on 11/21/23, per Frankey Cease in radiology.  Please contact patient to have them done    Scheduled procedure/appointment date if applicable: Appt 13/37/03

## 2023-11-15 ENCOUNTER — TELEPHONE (OUTPATIENT)
Age: 83
End: 2023-11-15

## 2023-11-16 ENCOUNTER — APPOINTMENT (OUTPATIENT)
Dept: LAB | Age: 83
End: 2023-11-16
Payer: COMMERCIAL

## 2023-11-16 DIAGNOSIS — K86.2 PANCREATIC CYST: ICD-10-CM

## 2023-11-16 LAB
BUN SERPL-MCNC: 30 MG/DL (ref 5–25)
CREAT SERPL-MCNC: 0.85 MG/DL (ref 0.6–1.3)
GFR SERPL CREATININE-BSD FRML MDRD: 64 ML/MIN/1.73SQ M

## 2023-11-16 PROCEDURE — 84520 ASSAY OF UREA NITROGEN: CPT

## 2023-11-16 PROCEDURE — 82565 ASSAY OF CREATININE: CPT

## 2023-11-16 PROCEDURE — 36415 COLL VENOUS BLD VENIPUNCTURE: CPT

## 2023-11-21 ENCOUNTER — HOSPITAL ENCOUNTER (OUTPATIENT)
Dept: MRI IMAGING | Facility: HOSPITAL | Age: 83
Discharge: HOME | End: 2023-11-21
Attending: INTERNAL MEDICINE
Payer: COMMERCIAL

## 2023-11-21 DIAGNOSIS — K86.2 PANCREATIC CYST: ICD-10-CM

## 2023-11-21 PROCEDURE — G1004 CDSM NDSC: HCPCS

## 2023-11-21 PROCEDURE — 74183 MRI ABD W/O CNTR FLWD CNTR: CPT

## 2023-11-21 PROCEDURE — A9585 GADOBUTROL INJECTION: HCPCS | Performed by: INTERNAL MEDICINE

## 2023-11-21 RX ORDER — GADOBUTROL 604.72 MG/ML
7 INJECTION INTRAVENOUS
Status: COMPLETED | OUTPATIENT
Start: 2023-11-21 | End: 2023-11-21

## 2023-11-21 RX ADMIN — GADOBUTROL 7 ML: 604.72 INJECTION INTRAVENOUS at 11:22

## 2023-11-22 ENCOUNTER — TELEPHONE (OUTPATIENT)
Age: 83
End: 2023-11-22

## 2023-11-22 NOTE — TELEPHONE ENCOUNTER
Patients GI provider:  Dr. Cristel Jolly    Number to return call: [phone]     Reason for call: Pt calling to obtain the results of her MRI performed on 11/21/2023. Informed pt results are still in process and a member of the clinical staff with contact her once they are received. Pt stated she can be reached at the home phone number and a message can be left if she does not answer.      Scheduled procedure/appointment date if applicable: N/A

## 2023-11-24 NOTE — TELEPHONE ENCOUNTER
Pt called to get her MRI results and stated she has never had to wait this long and would like the doctor or nurse to call her back with her results. I told the pt  it can take 1-2 weeks to get results . She said she usually gets a call back the next day with the results and would like the doctor or nurse to return her call.

## 2023-11-29 ENCOUNTER — TELEPHONE (OUTPATIENT)
Age: 83
End: 2023-11-29

## 2023-11-29 DIAGNOSIS — K86.2 PANCREATIC CYST: Primary | ICD-10-CM

## 2023-11-29 NOTE — TELEPHONE ENCOUNTER
Patients GI provider:  GAYLE Pack    Number to return call: [phone]    Reason for call: Floridalma Ovalle from Radiology called with significant findings on MRI of the ABD.      Scheduled procedure/appointment date if applicable: N/A

## 2023-11-29 NOTE — TELEPHONE ENCOUNTER
Patients GI provider:  Dr. Juan Jose Baeza    Number to return call: [phone]    Reason for call: Pt calling for her Results, she has been waiting over 1 week and is very frustrated. Please reach out to Radiology to result this for her.     Scheduled procedure/appointment date if applicable: 21/18/86

## 2023-12-11 ENCOUNTER — NURSE TRIAGE (OUTPATIENT)
Age: 83
End: 2023-12-11

## 2023-12-11 NOTE — TELEPHONE ENCOUNTER
SPOKE WITH PT, REVIEWED MRI RESULTS AND RECOMMENDATIONS, PT WANTS TO KNOW IF SHE SHOULD SCHEDULE F/U APPT? Reason for Disposition   Information only question and nurse able to answer    Answer Assessment - Initial Assessment Questions  1. REASON FOR CALL or QUESTION: "What is your reason for calling today?" or "How can I best help you?" or "What question do you have that I can help answer?"      PT RECEIVED LETTER TO CALL ABOUT MRI RESULTS. Protocols used:  Information Only Call - No Triage-ADULT-[place]

## 2023-12-12 NOTE — TELEPHONE ENCOUNTER
I called and spoke with patient advised no f.u needed until MRI     Patient states she does not wish to go back to [address] [place] [address] for MRI she wants to go to [facility]

## 2024-03-08 ENCOUNTER — APPOINTMENT (OUTPATIENT)
Dept: RADIOLOGY | Age: 84
End: 2024-03-08
Payer: COMMERCIAL

## 2024-03-08 DIAGNOSIS — E11.621 TYPE 2 DIABETES MELLITUS WITH FOOT ULCER, UNSPECIFIED WHETHER LONG TERM INSULIN USE (HCC): ICD-10-CM

## 2024-03-08 DIAGNOSIS — L97.509 TYPE 2 DIABETES MELLITUS WITH FOOT ULCER, UNSPECIFIED WHETHER LONG TERM INSULIN USE (HCC): ICD-10-CM

## 2024-03-08 PROCEDURE — 73630 X-RAY EXAM OF FOOT: CPT

## 2025-02-08 NOTE — TELEPHONE ENCOUNTER
Left message for patient offering 11/15/22 with Dr Sharron Holcomb at [facility] [place]  Gave my direct line to call back on  IV discontinued, cath removed intact

## 2025-07-28 ENCOUNTER — APPOINTMENT (OUTPATIENT)
Dept: RADIOLOGY | Facility: MEDICAL CENTER | Age: 85
End: 2025-07-28
Attending: ORTHOPAEDIC SURGERY
Payer: COMMERCIAL

## 2025-07-28 DIAGNOSIS — M79.644 THUMB PAIN, RIGHT: ICD-10-CM

## 2025-07-28 PROCEDURE — 73140 X-RAY EXAM OF FINGER(S): CPT
